# Patient Record
Sex: MALE | Race: WHITE | Employment: FULL TIME | URBAN - METROPOLITAN AREA
[De-identification: names, ages, dates, MRNs, and addresses within clinical notes are randomized per-mention and may not be internally consistent; named-entity substitution may affect disease eponyms.]

---

## 2019-06-01 ENCOUNTER — HOSPITAL ENCOUNTER (EMERGENCY)
Age: 44
Discharge: HOME | End: 2019-06-01
Attending: EMERGENCY MEDICINE
Payer: COMMERCIAL

## 2019-06-01 ENCOUNTER — APPOINTMENT (OUTPATIENT)
Dept: GENERAL RADIOLOGY | Age: 44
End: 2019-06-01
Payer: COMMERCIAL

## 2019-06-01 VITALS
HEART RATE: 81 BPM | OXYGEN SATURATION: 98 % | TEMPERATURE: 97.3 F | SYSTOLIC BLOOD PRESSURE: 144 MMHG | RESPIRATION RATE: 17 BRPM | HEIGHT: 72 IN | BODY MASS INDEX: 33.86 KG/M2 | WEIGHT: 250 LBS | DIASTOLIC BLOOD PRESSURE: 91 MMHG

## 2019-06-01 DIAGNOSIS — S42.92XA TRAUMATIC CLOSED DISPLACED FRACTURE OF LEFT SHOULDER WITH ANTERIOR DISLOCATION, INITIAL ENCOUNTER: Primary | ICD-10-CM

## 2019-06-01 PROCEDURE — 73030 X-RAY EXAM OF SHOULDER: CPT

## 2019-06-01 PROCEDURE — 99283 EMERGENCY DEPT VISIT LOW MDM: CPT

## 2019-06-01 PROCEDURE — 94761 N-INVAS EAR/PLS OXIMETRY MLT: CPT

## 2019-06-01 PROCEDURE — 2580000003 HC RX 258

## 2019-06-01 PROCEDURE — 2700000000 HC OXYGEN THERAPY PER DAY

## 2019-06-01 PROCEDURE — 4500000023 HC ED LEVEL 3 PROCEDURE

## 2019-06-01 PROCEDURE — 94770 HC ETCO2 MONITOR DAILY: CPT

## 2019-06-01 PROCEDURE — 2500000003 HC RX 250 WO HCPCS: Performed by: EMERGENCY MEDICINE

## 2019-06-01 RX ORDER — 0.9 % SODIUM CHLORIDE 0.9 %
1000 INTRAVENOUS SOLUTION INTRAVENOUS ONCE
Status: COMPLETED | OUTPATIENT
Start: 2019-06-01 | End: 2019-06-01

## 2019-06-01 RX ORDER — SODIUM CHLORIDE 9 MG/ML
INJECTION, SOLUTION INTRAVENOUS
Status: COMPLETED
Start: 2019-06-01 | End: 2019-06-01

## 2019-06-01 RX ORDER — PROPOFOL 10 MG/ML
1 INJECTION, EMULSION INTRAVENOUS ONCE
Status: DISCONTINUED | OUTPATIENT
Start: 2019-06-01 | End: 2019-06-01 | Stop reason: HOSPADM

## 2019-06-01 RX ORDER — VILAZODONE HYDROCHLORIDE 40 MG/1
40 TABLET ORAL DAILY
COMMUNITY

## 2019-06-01 RX ORDER — PROPOFOL 10 MG/ML
INJECTION, EMULSION INTRAVENOUS
Status: DISCONTINUED
Start: 2019-06-01 | End: 2019-06-01 | Stop reason: HOSPADM

## 2019-06-01 RX ORDER — KETAMINE HYDROCHLORIDE 100 MG/ML
1 INJECTION, SOLUTION INTRAMUSCULAR; INTRAVENOUS ONCE
Status: COMPLETED | OUTPATIENT
Start: 2019-06-01 | End: 2019-06-01

## 2019-06-01 RX ADMIN — Medication 1000 ML: at 11:03

## 2019-06-01 RX ADMIN — KETAMINE HYDROCHLORIDE 110 MG: 100 INJECTION INTRAMUSCULAR; INTRAVENOUS at 11:09

## 2019-06-01 RX ADMIN — SODIUM CHLORIDE 1000 ML: 9 INJECTION, SOLUTION INTRAVENOUS at 11:03

## 2019-06-01 SDOH — HEALTH STABILITY: MENTAL HEALTH: HOW OFTEN DO YOU HAVE A DRINK CONTAINING ALCOHOL?: NEVER

## 2019-06-01 ASSESSMENT — PAIN SCALES - GENERAL
PAINLEVEL_OUTOF10: 0
PAINLEVEL_OUTOF10: 8

## 2019-06-01 ASSESSMENT — PAIN DESCRIPTION - LOCATION: LOCATION: SHOULDER

## 2019-06-01 ASSESSMENT — PAIN DESCRIPTION - PAIN TYPE: TYPE: ACUTE PAIN

## 2019-06-01 ASSESSMENT — PAIN DESCRIPTION - ORIENTATION: ORIENTATION: LEFT

## 2019-06-01 NOTE — ED NOTES
--Patient provided with discharge instructions. --Instructions, and follow-up appointments reviewed with patient. No further questions or needs at this time. --Vital signs and patient stable upon discharge.   --Patient ambulatory to [facility] with friend       Betti Habermann, RN  06/01/19

## 2019-06-01 NOTE — ED PROVIDER NOTES
Inability: None    Transportation needs:     Medical: None     Non-medical: None   Tobacco Use    Smoking status: Never Smoker    Smokeless tobacco: Never Used   Substance and Sexual Activity    Alcohol use: Never     Frequency: Never    Drug use: Never    Sexual activity: None   Lifestyle    Physical activity:     Days per week: None     Minutes per session: None    Stress: None   Relationships    Social connections:     Talks on phone: None     Gets together: None     Attends Church service: None     Active member of club or organization: None     Attends meetings of clubs or organizations: None     Relationship status: None    Intimate partner violence:     Fear of current or ex partner: None     Emotionally abused: None     Physically abused: None     Forced sexual activity: None   Other Topics Concern    None   Social History Narrative    None       SCREENINGS             PHYSICAL EXAM    (up to 7 for level 4, 8 ormore for level 5)     ED Triage Vitals [06/01/19 1004]   BP Temp Temp Source Pulse Resp SpO2 Height Weight   (!) 142/98 97.3 °F (36.3 °C) Oral 96 18 98 % 6' (1.829 m) 250 lb (113.4 kg)       Physical Exam   Constitutional: He is oriented to person, place, and time. He appears well-developed and well-nourished. HENT:   Head: Normocephalic and atraumatic. Eyes: Conjunctivae and EOM are normal. Right eye exhibits no discharge. Left eye exhibits no discharge. Neck: Normal range of motion. Neck supple. Cardiovascular:   2+ radial pulse   Pulmonary/Chest: Effort normal. No respiratory distress. Musculoskeletal:        Left shoulder: He exhibits decreased range of motion, deformity and spasm. Left arm abducted, flexed with depression to lateral aspect   Neurological: He is alert and oriented to person, place, and time. Skin: He is not diaphoretic. No pallor. Psychiatric: He has a normal mood and affect. His behavior is normal.   Nursing note and vitals reviewed.       DIAGNOSTIC RESULTS     EKG: All EKG's are interpreted by the Emergency Department Physicianwho either signs or Co-signs this chart in the absence of a cardiologist.      RADIOLOGY:   Non-plain film images such as CT, Ultrasound and MRI are read by the radiologist. Plain radiographic images are visualized and preliminarily interpreted by the emergency physician with the below findings:      Interpretation per the Radiologist below, if available at the time of this note:    XR SHOULDER LEFT (MIN 2 VIEWS)   Final Result   Successful reduction left glenohumeral dislocation. XR SHOULDER LEFT (MIN 2 VIEWS)   Final Result   Acute anterior glenohumeral dislocation. ED BEDSIDE ULTRASOUND:   Performed by ED Physician - none    LABS:  Labs Reviewed - No data to display    All other labs were within normal range ornot returned as of this dictation. EMERGENCY DEPARTMENT COURSE and DIFFERENTIAL DIAGNOSIS/MDM:   Vitals:    Vitals:    06/01/19 1117 06/01/19 1120 06/01/19 1125 06/01/19 1128   BP:  (!) 179/108 (!) 155/108 (!) 160/102   Pulse:  100 90 71   Resp: 22 17 16 17   Temp:       TempSrc:       SpO2: 100% 100% 100% 100%   Weight:       Height:             MDM    ED COURSE/MDM    -Дмитрий Marks is a 44 y.o. male with a history of multiple left shoulder dislocation presents to ED for left shoulder pain. - patient reports he fell and landed o   -Xray left shoulder:anterior dislocation  -Procedure: regional shoulder block, attempted reduction without success  -Procedure; conscious sedation, successful reduction  -patient put in sling, 2+ radial pulse, brisk cap refill.   -  Assessment patient speaking full sen. Patient home with friend.  -Plan for discharge home with close follow up with PCP/orthopedic surgery referral given. Strict ED return precautions given for new/worsending symptoms. Patient in agreement withplan, verbally confirm understanding and have no further questions/concerns.        REASSESSMENT

## 2019-06-01 NOTE — ED NOTES
Dr Youngblood Level manually reduced left shoulder. Pt tolerated well.  Will obtain xray post reduction     Arlette Eisenmenger, RN  06/01/19 6889

## 2019-06-01 NOTE — ED NOTES
Pt awake, alert and oriented x 4, answering questions appropriately at this time. Pt states \"man that was a weird trip. \" Pt denies any pain at this time.       Dominguez Gonsalez RN  06/01/19 1122

## 2019-06-01 NOTE — ED NOTES
Pt now states he can get a hold of his friend to pick him up if he has to have procedural sedation for reduction of shoulder.       Joan Palumbo RN  06/01/19 0397

## 2023-02-28 ENCOUNTER — APPOINTMENT (OUTPATIENT)
Dept: GENERAL RADIOLOGY | Age: 48
End: 2023-02-28
Payer: COMMERCIAL

## 2023-02-28 ENCOUNTER — HOSPITAL ENCOUNTER (EMERGENCY)
Age: 48
Discharge: HOME | End: 2023-02-28
Payer: COMMERCIAL

## 2023-02-28 VITALS
HEIGHT: 72 IN | SYSTOLIC BLOOD PRESSURE: 133 MMHG | WEIGHT: 250 LBS | OXYGEN SATURATION: 93 % | DIASTOLIC BLOOD PRESSURE: 95 MMHG | BODY MASS INDEX: 33.86 KG/M2 | TEMPERATURE: 98 F | RESPIRATION RATE: 21 BRPM | HEART RATE: 69 BPM

## 2023-02-28 DIAGNOSIS — R07.9 CHEST PAIN, UNSPECIFIED TYPE: Primary | ICD-10-CM

## 2023-02-28 LAB
A/G RATIO: 1.6 (ref 1.1–2.2)
ALBUMIN SERPL-MCNC: 4.3 G/DL (ref 3.4–5)
ALP BLD-CCNC: 44 U/L (ref 40–129)
ALT SERPL-CCNC: 27 U/L (ref 10–40)
ANION GAP SERPL CALCULATED.3IONS-SCNC: 14 MMOL/L (ref 3–16)
AST SERPL-CCNC: 21 U/L (ref 15–37)
BASOPHILS ABSOLUTE: 0.1 K/UL (ref 0–0.2)
BASOPHILS RELATIVE PERCENT: 1.3 %
BILIRUB SERPL-MCNC: 0.8 MG/DL (ref 0–1)
BUN BLDV-MCNC: 15 MG/DL (ref 7–20)
CALCIUM SERPL-MCNC: 9.7 MG/DL (ref 8.3–10.6)
CHLORIDE BLD-SCNC: 101 MMOL/L (ref 99–110)
CO2: 20 MMOL/L (ref 21–32)
CREAT SERPL-MCNC: 0.9 MG/DL (ref 0.9–1.3)
D DIMER: <0.27 UG/ML FEU (ref 0–0.6)
EKG ATRIAL RATE: 63 BPM
EKG ATRIAL RATE: 70 BPM
EKG DIAGNOSIS: NORMAL
EKG DIAGNOSIS: NORMAL
EKG P AXIS: 18 DEGREES
EKG P AXIS: 22 DEGREES
EKG P-R INTERVAL: 168 MS
EKG P-R INTERVAL: 186 MS
EKG Q-T INTERVAL: 368 MS
EKG Q-T INTERVAL: 392 MS
EKG QRS DURATION: 80 MS
EKG QRS DURATION: 84 MS
EKG QTC CALCULATION (BAZETT): 397 MS
EKG QTC CALCULATION (BAZETT): 401 MS
EKG R AXIS: 37 DEGREES
EKG R AXIS: 43 DEGREES
EKG T AXIS: 26 DEGREES
EKG T AXIS: 38 DEGREES
EKG VENTRICULAR RATE: 63 BPM
EKG VENTRICULAR RATE: 70 BPM
EOSINOPHILS ABSOLUTE: 0.1 K/UL (ref 0–0.6)
EOSINOPHILS RELATIVE PERCENT: 1.2 %
GFR SERPL CREATININE-BSD FRML MDRD: >60 ML/MIN/{1.73_M2}
GLUCOSE BLD-MCNC: 110 MG/DL (ref 70–99)
HCT VFR BLD CALC: 48.1 % (ref 40.5–52.5)
HEMOGLOBIN: 16.5 G/DL (ref 13.5–17.5)
LYMPHOCYTES ABSOLUTE: 2.4 K/UL (ref 1–5.1)
LYMPHOCYTES RELATIVE PERCENT: 38.1 %
MCH RBC QN AUTO: 28 PG (ref 26–34)
MCHC RBC AUTO-ENTMCNC: 34.4 G/DL (ref 31–36)
MCV RBC AUTO: 81.5 FL (ref 80–100)
MONOCYTES ABSOLUTE: 0.5 K/UL (ref 0–1.3)
MONOCYTES RELATIVE PERCENT: 8.4 %
NEUTROPHILS ABSOLUTE: 3.3 K/UL (ref 1.7–7.7)
NEUTROPHILS RELATIVE PERCENT: 51 %
PDW BLD-RTO: 12.9 % (ref 12.4–15.4)
PLATELET # BLD: 266 K/UL (ref 135–450)
PMV BLD AUTO: 7.2 FL (ref 5–10.5)
POTASSIUM REFLEX MAGNESIUM: 3.7 MMOL/L (ref 3.5–5.1)
RBC # BLD: 5.9 M/UL (ref 4.2–5.9)
SODIUM BLD-SCNC: 135 MMOL/L (ref 136–145)
TOTAL PROTEIN: 7 G/DL (ref 6.4–8.2)
TROPONIN: <0.01 NG/ML
TROPONIN: <0.01 NG/ML
WBC # BLD: 6.4 K/UL (ref 4–11)

## 2023-02-28 PROCEDURE — 94640 AIRWAY INHALATION TREATMENT: CPT

## 2023-02-28 PROCEDURE — 80053 COMPREHEN METABOLIC PANEL: CPT

## 2023-02-28 PROCEDURE — 84484 ASSAY OF TROPONIN QUANT: CPT

## 2023-02-28 PROCEDURE — 93005 ELECTROCARDIOGRAM TRACING: CPT | Performed by: STUDENT IN AN ORGANIZED HEALTH CARE EDUCATION/TRAINING PROGRAM

## 2023-02-28 PROCEDURE — 85379 FIBRIN DEGRADATION QUANT: CPT

## 2023-02-28 PROCEDURE — 36415 COLL VENOUS BLD VENIPUNCTURE: CPT

## 2023-02-28 PROCEDURE — 6370000000 HC RX 637 (ALT 250 FOR IP): Performed by: NURSE PRACTITIONER

## 2023-02-28 PROCEDURE — 85025 COMPLETE CBC W/AUTO DIFF WBC: CPT

## 2023-02-28 PROCEDURE — 99285 EMERGENCY DEPT VISIT HI MDM: CPT

## 2023-02-28 PROCEDURE — 71046 X-RAY EXAM CHEST 2 VIEWS: CPT

## 2023-02-28 PROCEDURE — 93005 ELECTROCARDIOGRAM TRACING: CPT | Performed by: NURSE PRACTITIONER

## 2023-02-28 RX ORDER — IPRATROPIUM BROMIDE AND ALBUTEROL SULFATE 2.5; .5 MG/3ML; MG/3ML
1 SOLUTION RESPIRATORY (INHALATION) ONCE
Status: COMPLETED | OUTPATIENT
Start: 2023-02-28 | End: 2023-02-28

## 2023-02-28 RX ADMIN — IPRATROPIUM BROMIDE AND ALBUTEROL SULFATE 1 AMPULE: 2.5; .5 SOLUTION RESPIRATORY (INHALATION) at 11:59

## 2023-02-28 ASSESSMENT — PAIN DESCRIPTION - DESCRIPTORS: DESCRIPTORS: PRESSURE

## 2023-02-28 ASSESSMENT — PAIN - FUNCTIONAL ASSESSMENT: PAIN_FUNCTIONAL_ASSESSMENT: 0-10

## 2023-02-28 ASSESSMENT — HEART SCORE
ECG: 0
ECG: 0

## 2023-02-28 ASSESSMENT — PAIN DESCRIPTION - LOCATION: LOCATION: CHEST

## 2023-02-28 ASSESSMENT — PAIN SCALES - GENERAL: PAINLEVEL_OUTOF10: 3

## 2023-02-28 NOTE — ED PROVIDER NOTES
[address] [place] [address]  ED  [address] [address]  Dept: [phone]  Dept Fax: [phone]  Loc: [facility]        ***This patient was not seen or evaluated by the attending physician.    ***I evaluated this patient, the attending physician was available for consultation. CHIEF COMPLAINT    Chief Complaint   Patient presents with    Chest Pain     Pt called 911 with c/o chest pain. Squad reports giving 324mg of aspirin and 1 nitro in route. HPI    Shayne Interiano is a 56 y.o. male who presents to the emergency department with complaint of left-sided chest tightness that has been constant since the onset and about 1030 last night. He is reporting shortness of breath and lightheadedness. No vomiting. No diaphoresis. He was given a full dose aspirin via squad and 1 nitro sublingual tablet that his helped the pain and is currently down to a 3/10 on arrival.  He has never had this before. He denies history of DVT or PE. No family history. He is not a cigarette smoker. No recent travel. He has never been on a stress test.  He denies body aches, fever, chills or cough. He states the chest tightness feels like it is squeezing his entire left side of his chest,    REVIEW OF SYSTEMS    Cardiac: see HPI, no syncope  Respiratory: + shortness of breath, no cough, no hemoptysis  GI: No vomiting or diarrhea  : No dysuria or hematuria  General: No fever or chills  All other systems reviewed and are negative. PAST MEDICAL & SURGICAL HISTORY    Past Medical History:   Diagnosis Date    Anxiety     Depression     Shoulder dislocation     left     No past surgical history on file.     CURRENT MEDICATIONS  (may include discharge medications prescribed in the ED)  Current Outpatient Rx   Medication Sig Dispense Refill    vilazodone HCl (VILAZODONE HCL) 40 MG TABS Take 40 mg by mouth daily      MELOXICAM PO Take by mouth ALLERGIES    No Known Allergies    SOCIAL & FAMILY HISTORY    Social History     Socioeconomic History    Marital status: Single   Tobacco Use    Smoking status: Never    Smokeless tobacco: Never   Substance and Sexual Activity    Alcohol use: Never    Drug use: Never     No family history on file. PHYSICAL EXAM    VITAL SIGNS: BP (!) 120/91   Pulse 57   Temp 98 °F (36.7 °C) (Oral)   Resp 12   Ht 6' (1.829 m)   Wt 250 lb (113.4 kg)   SpO2 98%   BMI 33.91 kg/m²    Constitutional:  Well developed, well nourished, no acute distress   HENT:  Atraumatic, moist mucus membranes  Neck: supple, no JVD   Respiratory:  Lungs clear to auscultation bilaterally, no retractions   Cardiovascular: Regular rhythm and rate, S1-S2. No murmur  Vascular: Radial and DP pulses 2+ and equal bilaterally  GI:  Soft, nontender, normal bowel sounds  Musculoskeletal:  no lower extremity edema, no lower extremity asymmetry, no calf tenderness, no thigh tenderness, no acute deformities  Integument:  Skin warm and dry, no petechiae   Neurologic:  Alert & oriented, no slurred speech  Psych: Pleasant affect, no hallucinations    EKG    I have reviewed and interpreted today's EKG with the following findings:  Ventricular rate 70 bpm, parable 168 ms, QRS duration 80 ms, QTc 397 ms  No ST elevation or depression. No abnormal T wave inversions noted. Interpretation is normal sinus    No old EKG found for comparison    RADIOLOGY/PROCEDURES    XR CHEST (2 VW)   Final Result   No acute process. Labs Reviewed   COMPREHENSIVE METABOLIC PANEL W/ REFLEX TO MG FOR LOW K - Abnormal; Notable for the following components:       Result Value    Sodium 135 (*)     CO2 20 (*)     Glucose 110 (*)     All other components within normal limits   CBC WITH AUTO DIFFERENTIAL   TROPONIN       ED COURSE & MEDICAL DECISION MAKING    Pertinent Labs & Imaging studies reviewed and interpreted.  (See chart for details)  The patient was immediately placed on the cardiac monitor. IV access obtained. ASA was given via EMS. See chart for details of medications given during the ED stay. Vitals:    02/28/23 0957 02/28/23 1015   BP: (!) 140/99 (!) 120/91   Pulse: 80 57   Resp: 17 12   Temp: 98 °F (36.7 °C)    TempSrc: Oral    SpO2: 95% 98%   Weight: 250 lb (113.4 kg)    Height: 6' (1.829 m)      Medications - No data to display    Differential Diagnosis: Acute Coronary Syndrome, Congestive Heart Failure, Thoracic Dissection, Pericarditis, Pericardial Effusion, Pulmonary Embolism, Pneumonia, Pneumothorax, Ischemic Bowel, Bowel Obstruction, PUD, GERD, Acute Cholecystitis, Pancreatitis, Hepatitis, Colitis, other                                  Heart Score is 2. CRITICAL CARE NOTE:  There was a high probability of clinically significant life-threatening deterioration of the patient's condition requiring my urgent intervention. Total critical care time was at least *** minutes. This includes vital sign monitoring, pulse oximetry monitoring, telemetry monitoring, clinical response to the IV medications, reviewing the nursing notes, consultation time, dictation/documentation time, and interpretation of the labwork. This excludes any separately billable procedures performed. ***The critical care time above also includes time spent obtaining history from ***, as the patient was unable to provide the history AND the history obtained was directly relevant to the care of the patient. ***Patient is afebrile and nontoxic in appearance. Labs reveal ***no leukocytosis or anemia. ***Metabolic panel unremarkable. CXR findings as above***.      ***EKG interpreted by physician.  ***Troponin negative.     ***Patient is PERC negative, and is low risk for pulmonary embolism: age is <50, HR <100, O2 Sat on RA >95%, no prior history of venous thromboembolism, no trauma or surgery within the past 4 weeks, no hemoptysis, no exogenous estrogen use, and no unilateral leg swelling. Patient is also in the ***low risk group per the Wells' Criteria for Pulmonary Embolism: no clinical signs or symptoms of DVT, PE is not the #1 most likely diagnosis, heart rate <100, patient not immobilized for 3 days, no surgery the previous 4 weeks, no previously diagnosed PE or DVT, no hemoptysis, no treated malignancy within 6 months, not in palliative care for malignancy. Patient's HEART score is ***    Reevaluation at ***: ***Patient is resting comfortably. ***    Consultation with hospitalist at ***: I contacted Dr. Jordan Macario via [address] [place] [address], and the patient was ***accepted for admission. FINAL IMPRESSION    No diagnosis found.     PLAN  ***Admission to the hospital    (Please note that this note was completed with a voice recognition program.  Every attempt was made to edit the dictations, but inevitably there remain words that are mis-transcribed.)

## 2023-02-28 NOTE — DISCHARGE INSTRUCTIONS
I have ordered for you to have an outpatient cardiac stress test.    Someone from the scheduling center will call you to schedule this.

## 2023-02-28 NOTE — ED PROVIDER NOTES
Reviewed ECG completed for Isla Castleman Hillerich. I did not see this patient and was not involved in their care. ECG  The Ekg interpreted by me shows  normal sinus rhythm with a rate of 70  Axis is   Normal  QTc is  normal  Intervals and Durations are unremarkable.       ST Segments: normal  No previous for comparison        Hina Granados MD  02/28/23 7758

## 2023-02-28 NOTE — ED NOTES
Writer reviewed discharge instructions, medications, and follow-up with cardiology and to schedule outpatient stress test; patient verbalized understanding. Patient's IV removed with no complications with dressing in place. Patient stable, ambulatory with steady gait, GCS 15, no signs/ symptoms of acute distress, respirations unlabored, and with spouse.       Caitlin Grossman RN  02/28/23 9139